# Patient Record
Sex: MALE | Employment: FULL TIME | URBAN - METROPOLITAN AREA
[De-identification: names, ages, dates, MRNs, and addresses within clinical notes are randomized per-mention and may not be internally consistent; named-entity substitution may affect disease eponyms.]

---

## 2023-12-01 ENCOUNTER — TELEPHONE (OUTPATIENT)
Dept: FAMILY MEDICINE | Facility: CLINIC | Age: 48
End: 2023-12-01

## 2023-12-01 NOTE — TELEPHONE ENCOUNTER
Pt has a cold, cough Pt has been having symptoms for five days now Pt would like medication to help with this sending to provider for same day approval. \                  Christin BAPTISTE Visit Facilitator

## 2023-12-04 NOTE — TELEPHONE ENCOUNTER
RN called patient to triage. Appears there is a separate account for this patient. Documentation will be made in that chart. Please view appropriate encounter in patient's other chart.     ESTHELA Martel  [facility] Triage